# Patient Record
Sex: FEMALE | Race: OTHER | HISPANIC OR LATINO | Employment: UNEMPLOYED | URBAN - METROPOLITAN AREA
[De-identification: names, ages, dates, MRNs, and addresses within clinical notes are randomized per-mention and may not be internally consistent; named-entity substitution may affect disease eponyms.]

---

## 2022-06-07 ENCOUNTER — HOSPITAL ENCOUNTER (EMERGENCY)
Facility: HOSPITAL | Age: 47
Discharge: HOME | End: 2022-06-07
Attending: EMERGENCY MEDICINE | Admitting: EMERGENCY MEDICINE

## 2022-06-07 ENCOUNTER — APPOINTMENT (EMERGENCY)
Dept: CT IMAGING | Facility: HOSPITAL | Age: 47
End: 2022-06-07

## 2022-06-07 VITALS
TEMPERATURE: 98.3 F | RESPIRATION RATE: 18 BRPM | WEIGHT: 119.05 LBS | SYSTOLIC BLOOD PRESSURE: 134 MMHG | OXYGEN SATURATION: 100 % | HEART RATE: 72 BPM | DIASTOLIC BLOOD PRESSURE: 83 MMHG

## 2022-06-07 DIAGNOSIS — R10.9 ABDOMINAL PAIN: Primary | ICD-10-CM

## 2022-06-07 DIAGNOSIS — K80.20 CHOLELITHIASIS: ICD-10-CM

## 2022-06-07 DIAGNOSIS — R11.0 NAUSEA: ICD-10-CM

## 2022-06-07 LAB
ALBUMIN SERPL BCP-MCNC: 3.4 G/DL (ref 3.5–5)
ALP SERPL-CCNC: 92 U/L (ref 46–116)
ALT SERPL W P-5'-P-CCNC: 26 U/L (ref 12–78)
ANION GAP SERPL CALCULATED.3IONS-SCNC: 9 MMOL/L (ref 4–13)
AST SERPL W P-5'-P-CCNC: 22 U/L (ref 5–45)
BACTERIA UR QL AUTO: NORMAL /HPF
BASOPHILS # BLD AUTO: 0.05 THOUSANDS/ΜL (ref 0–0.1)
BASOPHILS NFR BLD AUTO: 1 % (ref 0–1)
BILIRUB SERPL-MCNC: 0.23 MG/DL (ref 0.2–1)
BILIRUB UR QL STRIP: NEGATIVE
BUN SERPL-MCNC: 16 MG/DL (ref 5–25)
CALCIUM ALBUM COR SERPL-MCNC: 9.6 MG/DL (ref 8.3–10.1)
CALCIUM SERPL-MCNC: 9.1 MG/DL (ref 8.3–10.1)
CHLORIDE SERPL-SCNC: 103 MMOL/L (ref 100–108)
CLARITY UR: CLEAR
CO2 SERPL-SCNC: 25 MMOL/L (ref 21–32)
COLOR UR: YELLOW
CREAT SERPL-MCNC: 0.73 MG/DL (ref 0.6–1.3)
EOSINOPHIL # BLD AUTO: 0.25 THOUSAND/ΜL (ref 0–0.61)
EOSINOPHIL NFR BLD AUTO: 3 % (ref 0–6)
ERYTHROCYTE [DISTWIDTH] IN BLOOD BY AUTOMATED COUNT: 17 % (ref 11.6–15.1)
EXT PREG TEST URINE: NEGATIVE
EXT. CONTROL ED NAV: NORMAL
GFR SERPL CREATININE-BSD FRML MDRD: 97 ML/MIN/1.73SQ M
GLUCOSE SERPL-MCNC: 206 MG/DL (ref 65–140)
GLUCOSE UR STRIP-MCNC: NEGATIVE MG/DL
HCT VFR BLD AUTO: 41.9 % (ref 34.8–46.1)
HGB BLD-MCNC: 13.2 G/DL (ref 11.5–15.4)
HGB UR QL STRIP.AUTO: ABNORMAL
IMM GRANULOCYTES # BLD AUTO: 0.02 THOUSAND/UL (ref 0–0.2)
IMM GRANULOCYTES NFR BLD AUTO: 0 % (ref 0–2)
KETONES UR STRIP-MCNC: NEGATIVE MG/DL
LEUKOCYTE ESTERASE UR QL STRIP: NEGATIVE
LIPASE SERPL-CCNC: 109 U/L (ref 73–393)
LYMPHOCYTES # BLD AUTO: 3.1 THOUSANDS/ΜL (ref 0.6–4.47)
LYMPHOCYTES NFR BLD AUTO: 42 % (ref 14–44)
MCH RBC QN AUTO: 26.6 PG (ref 26.8–34.3)
MCHC RBC AUTO-ENTMCNC: 31.5 G/DL (ref 31.4–37.4)
MCV RBC AUTO: 84 FL (ref 82–98)
MONOCYTES # BLD AUTO: 0.52 THOUSAND/ΜL (ref 0.17–1.22)
MONOCYTES NFR BLD AUTO: 7 % (ref 4–12)
NEUTROPHILS # BLD AUTO: 3.43 THOUSANDS/ΜL (ref 1.85–7.62)
NEUTS SEG NFR BLD AUTO: 47 % (ref 43–75)
NITRITE UR QL STRIP: NEGATIVE
NON-SQ EPI CELLS URNS QL MICRO: NORMAL /HPF
NRBC BLD AUTO-RTO: 0 /100 WBCS
PH UR STRIP.AUTO: 5.5 [PH] (ref 4.5–8)
PLATELET # BLD AUTO: 197 THOUSANDS/UL (ref 149–390)
PMV BLD AUTO: 11.7 FL (ref 8.9–12.7)
POTASSIUM SERPL-SCNC: 4.5 MMOL/L (ref 3.5–5.3)
PROT SERPL-MCNC: 7.6 G/DL (ref 6.4–8.2)
PROT UR STRIP-MCNC: NEGATIVE MG/DL
RBC # BLD AUTO: 4.97 MILLION/UL (ref 3.81–5.12)
RBC #/AREA URNS AUTO: NORMAL /HPF
SODIUM SERPL-SCNC: 137 MMOL/L (ref 136–145)
SP GR UR STRIP.AUTO: >=1.03 (ref 1–1.03)
UROBILINOGEN UR QL STRIP.AUTO: 0.2 E.U./DL
WBC # BLD AUTO: 7.37 THOUSAND/UL (ref 4.31–10.16)
WBC #/AREA URNS AUTO: NORMAL /HPF

## 2022-06-07 PROCEDURE — 81001 URINALYSIS AUTO W/SCOPE: CPT

## 2022-06-07 PROCEDURE — 99284 EMERGENCY DEPT VISIT MOD MDM: CPT

## 2022-06-07 PROCEDURE — 99284 EMERGENCY DEPT VISIT MOD MDM: CPT | Performed by: EMERGENCY MEDICINE

## 2022-06-07 PROCEDURE — G1004 CDSM NDSC: HCPCS

## 2022-06-07 PROCEDURE — 83690 ASSAY OF LIPASE: CPT | Performed by: EMERGENCY MEDICINE

## 2022-06-07 PROCEDURE — 96361 HYDRATE IV INFUSION ADD-ON: CPT

## 2022-06-07 PROCEDURE — 96375 TX/PRO/DX INJ NEW DRUG ADDON: CPT

## 2022-06-07 PROCEDURE — 74176 CT ABD & PELVIS W/O CONTRAST: CPT

## 2022-06-07 PROCEDURE — 36415 COLL VENOUS BLD VENIPUNCTURE: CPT | Performed by: EMERGENCY MEDICINE

## 2022-06-07 PROCEDURE — 81025 URINE PREGNANCY TEST: CPT | Performed by: EMERGENCY MEDICINE

## 2022-06-07 PROCEDURE — 80053 COMPREHEN METABOLIC PANEL: CPT | Performed by: EMERGENCY MEDICINE

## 2022-06-07 PROCEDURE — 96374 THER/PROPH/DIAG INJ IV PUSH: CPT

## 2022-06-07 PROCEDURE — 85025 COMPLETE CBC W/AUTO DIFF WBC: CPT | Performed by: EMERGENCY MEDICINE

## 2022-06-07 RX ORDER — KETOROLAC TROMETHAMINE 30 MG/ML
15 INJECTION, SOLUTION INTRAMUSCULAR; INTRAVENOUS ONCE
Status: COMPLETED | OUTPATIENT
Start: 2022-06-07 | End: 2022-06-07

## 2022-06-07 RX ORDER — METOCLOPRAMIDE 10 MG/1
10 TABLET ORAL EVERY 6 HOURS
Qty: 10 TABLET | Refills: 0 | Status: SHIPPED | OUTPATIENT
Start: 2022-06-07

## 2022-06-07 RX ORDER — ONDANSETRON 2 MG/ML
4 INJECTION INTRAMUSCULAR; INTRAVENOUS ONCE
Status: COMPLETED | OUTPATIENT
Start: 2022-06-07 | End: 2022-06-07

## 2022-06-07 RX ORDER — DICYCLOMINE HCL 20 MG
20 TABLET ORAL 2 TIMES DAILY
Qty: 10 TABLET | Refills: 0 | Status: SHIPPED | OUTPATIENT
Start: 2022-06-07

## 2022-06-07 RX ADMIN — SODIUM CHLORIDE 1000 ML: 0.9 INJECTION, SOLUTION INTRAVENOUS at 13:00

## 2022-06-07 RX ADMIN — ONDANSETRON 4 MG: 2 INJECTION INTRAMUSCULAR; INTRAVENOUS at 13:02

## 2022-06-07 RX ADMIN — KETOROLAC TROMETHAMINE 15 MG: 30 INJECTION, SOLUTION INTRAMUSCULAR; INTRAVENOUS at 13:02

## 2022-06-07 NOTE — ED PROVIDER NOTES
History  Chief Complaint   Patient presents with    Abdominal Pain     Rlq pain x2 days  Denies n/v/d     49 y o  F w/h/o DM, abdominoplasty p/w RLQ pain x 2 days  Intermittent for 3 months  Worse over the past few days  Radiates to epigastric area  "Strong" pain  Worse with eating  History provided by:  Patient   used: Yes ([email or web address] [id number])    Abdominal Pain  Pain location:  RLQ  Pain quality comment:  "strong"  Pain radiates to:  Epigastric region  Duration:  2 days  Chronicity:  New  Context: previous surgery (Cholecystectomy, abdominoplasty)    Relieved by:  None tried  Worsened by:  Nothing  Ineffective treatments:  None tried  Associated symptoms: nausea    Associated symptoms: no constipation, no diarrhea, no dysuria, no fever, no hematuria and no vomiting        None       Past Medical History:   Diagnosis Date    [facility]        History reviewed  No pertinent surgical history  History reviewed  No pertinent family history  I have reviewed and agree with the history as documented  E-Cigarette/Vaping     E-Cigarette/Vaping Substances     Social History     Tobacco Use    Smoking status: Current Every Day Smoker     Packs/day: 0 25     Types: Cigarettes    Smokeless tobacco: Never Used   Substance Use Topics    Alcohol use: Not Currently    Drug use: Never       Review of Systems   Constitutional: Negative for fever  Gastrointestinal: Positive for abdominal pain and nausea  Negative for constipation, diarrhea and vomiting  Genitourinary: Negative for dysuria and hematuria  All other systems reviewed and are negative  Physical Exam  Physical Exam  Vitals and nursing note reviewed  Constitutional:       General: She is not in acute distress  Appearance: Normal appearance  She is well-developed  She is not ill-appearing, toxic-appearing or diaphoretic  HENT:      Head: Normocephalic and atraumatic     Eyes:      General: No scleral icterus  Neck:      Vascular: No JVD  Trachea: Trachea normal    Cardiovascular:      Rate and Rhythm: Normal rate and regular rhythm  Heart sounds: Normal heart sounds  No murmur heard  No friction rub  Pulmonary:      Effort: Pulmonary effort is normal  No accessory muscle usage or respiratory distress  Breath sounds: Normal breath sounds  No stridor  No wheezing, rhonchi or rales  Abdominal:      General: There is no distension  Palpations: Abdomen is soft  Abdomen is not rigid  There is no mass  Tenderness: There is abdominal tenderness in the right lower quadrant  There is no guarding or rebound  Musculoskeletal:      Cervical back: Normal range of motion  Skin:     General: Skin is warm and dry  Coloration: Skin is not pale  Findings: No rash  Neurological:      Mental Status: She is alert  GCS: GCS eye subscore is 4  GCS verbal subscore is 5  GCS motor subscore is 6     Psychiatric:         Behavior: Behavior normal          Vital Signs  ED Triage Vitals   Temperature Pulse Respirations Blood Pressure SpO2   06/07/22 1233 06/07/22 1233 06/07/22 1233 06/07/22 1233 06/07/22 1233   98 3 °F (36 8 °C) 72 18 134/83 100 %      Temp Source Heart Rate Source Patient Position - Orthostatic VS BP Location FiO2 (%)   06/07/22 1233 06/07/22 1233 06/07/22 1233 06/07/22 1233 --   Oral Monitor Sitting Right arm       Pain Score       06/07/22 1302       9           Vitals:    06/07/22 1233   BP: 134/83   Pulse: 72   Patient Position - Orthostatic VS: Sitting         Visual Acuity      ED Medications  Medications   ketorolac (TORADOL) injection 15 mg (15 mg Intravenous Given 6/7/22 1302)   ondansetron (ZOFRAN) injection 4 mg (4 mg Intravenous Given 6/7/22 1302)   sodium chloride 0 9 % bolus 1,000 mL (0 mL Intravenous Stopped 6/7/22 1428)       Diagnostic Studies  Results Reviewed     Procedure Component Value Units Date/Time    Comprehensive metabolic panel [[id number]] (Abnormal) Collected: 06/07/22 1258    Lab Status: Final result Specimen: Blood from Hand, Right Updated: 06/07/22 1344     Sodium 137 mmol/L      Potassium 4 5 mmol/L      Chloride 103 mmol/L      CO2 25 mmol/L      ANION GAP 9 mmol/L      BUN 16 mg/dL      Creatinine 0 73 mg/dL      Glucose 206 mg/dL      Calcium 9 1 mg/dL      Corrected Calcium 9 6 mg/dL      AST 22 U/L      ALT 26 U/L      Alkaline Phosphatase 92 U/L      Total Protein 7 6 g/dL      Albumin 3 4 g/dL      Total Bilirubin 0 23 mg/dL      eGFR 97 ml/min/1 73sq m     Narrative:      National Kidney Disease Foundation guidelines for Chronic Kidney Disease (CKD):     Stage 1 with normal or high GFR (GFR > 90 mL/min/1 73 square meters)    Stage 2 Mild CKD (GFR = 60-89 mL/min/1 73 square meters)    Stage 3A Moderate CKD (GFR = 45-59 mL/min/1 73 square meters)    Stage 3B Moderate CKD (GFR = 30-44 mL/min/1 73 square meters)    Stage 4 Severe CKD (GFR = 15-29 mL/min/1 73 square meters)    Stage 5 End Stage CKD (GFR <15 mL/min/1 73 square meters)  Note: GFR calculation is accurate only with a steady state creatinine    Lipase [[id number]]  (Normal) Collected: 06/07/22 1258    Lab Status: Final result Specimen: Blood from Hand, Right Updated: 06/07/22 1344     Lipase 109 u/L     Urine Microscopic [[id number]]  (Normal) Collected: 06/07/22 1251    Lab Status: Final result Specimen: Urine Updated: 06/07/22 1334     RBC, UA None Seen /hpf      WBC, UA None Seen /hpf      Epithelial Cells Occasional /hpf      Bacteria, UA None Seen /hpf     CBC and differential [[id number]]  (Abnormal) Collected: 06/07/22 1258    Lab Status: Final result Specimen: Blood from Arm, Right Updated: 06/07/22 1312     WBC 7 37 Thousand/uL      RBC 4 97 Million/uL      Hemoglobin 13 2 g/dL      Hematocrit 41 9 %      MCV 84 fL      MCH 26 6 pg      MCHC 31 5 g/dL      RDW 17 0 %      MPV 11 7 fL      Platelets 614 Thousands/uL      nRBC 0 /100 WBCs      Neutrophils Relative 47 % Immat GRANS % 0 %      Lymphocytes Relative 42 %      Monocytes Relative 7 %      Eosinophils Relative 3 %      Basophils Relative 1 %      Neutrophils Absolute 3 43 Thousands/µL      Immature Grans Absolute 0 02 Thousand/uL      Lymphocytes Absolute 3 10 Thousands/µL      Monocytes Absolute 0 52 Thousand/µL      Eosinophils Absolute 0 25 Thousand/µL      Basophils Absolute 0 05 Thousands/µL     POCT pregnancy, urine [[id number]]  (Normal) Resulted: 06/07/22 1306    Lab Status: Final result Updated: 06/07/22 1306     EXT PREG TEST UR (Ref: Negative) negative     Control valid    Urine Macroscopic, POC [[id number]]  (Abnormal) Collected: 06/07/22 1251    Lab Status: Final result Specimen: Urine Updated: 06/07/22 1253     Color, UA Yellow     Clarity, UA Clear     pH, UA 5 5     Leukocytes, UA Negative     Nitrite, UA Negative     Protein, UA Negative mg/dl      Glucose, UA Negative mg/dl      Ketones, UA Negative mg/dl      Urobilinogen, UA 0 2 E U /dl      Bilirubin, UA Negative     Blood, UA Trace     Specific Gravity, UA >=1 030    Narrative:      CLINITEK RESULT                 CT abdomen pelvis wo contrast   Final Result by Samantha Knight MD (06/07 1423)      No source for acute abdominal pain identified  A normal appendix is visualized  Colonic diverticulosis without diverticulitis  Cholelithiasis without cholecystitis  Workstation performed: [id number]                    Procedures  Procedures         ED Course  ED Course as of 06/07/22 1514   Tue Jun 07, 2022   1302 Pt given Toradol/Zofran  Serafin Lemons [id number], discussed results and instructed pt to f/u with GI since pain has been ongoing for 3 months                                               MDM    Disposition  Final diagnoses:   Abdominal pain   Nausea   Cholelithiasis     Time reflects when diagnosis was documented in both MDM as applicable and the Disposition within this note     Time User Action Codes Description Comment 6/7/2022  2:45 PM Melvina Moore Add [R10 9] Abdominal pain     6/7/2022  2:45 PM Yolanda Moore 48 [R11 0] Nausea     6/7/2022  2:56 PM JANINE Moore Home	[place] Add [K80 20] Cholelithiasis       ED Disposition     ED Disposition   Discharge    Condition   Stable    Date/Time   Tue Jun 7, 2022  2:57 PM    Comment   Genevive Hodgkins discharge to home/self care                 Follow-up Information     Follow up With Specialties Details Why Contact Info Additional [facility] Schedule an appointment as soon as possible for a visit   [address] [facility]  [address] [facility] [address]  Negro Rodriguez [address] [facility], [address], [address], [place], [place], [address] [phone]          Patient's Medications   Discharge Prescriptions    DICYCLOMINE (BENTYL) 20 MG TABLET    Take 1 tablet (20 mg total) by mouth 2 (two) times a day       Start Date: 6/7/2022  End Date: --       Order Dose: 20 mg       Quantity: 10 tablet    Refills: 0    METOCLOPRAMIDE (REGLAN) 10 MG TABLET    Take 1 tablet (10 mg total) by mouth every 6 (six) hours       Start Date: 6/7/2022  End Date: --       Order Dose: 10 mg       Quantity: 10 tablet    Refills: 0           PDMP Review     None          ED Provider  Electronically Signed by           Donnetta Schaumann, DO  06/07/22 6425

## 2022-07-05 ENCOUNTER — HOSPITAL ENCOUNTER (EMERGENCY)
Facility: HOSPITAL | Age: 47
Discharge: HOME | End: 2022-07-05
Attending: EMERGENCY MEDICINE

## 2022-07-05 ENCOUNTER — APPOINTMENT (EMERGENCY)
Dept: RADIOLOGY | Facility: HOSPITAL | Age: 47
End: 2022-07-05

## 2022-07-05 VITALS
WEIGHT: 130.51 LBS | HEART RATE: 85 BPM | SYSTOLIC BLOOD PRESSURE: 126 MMHG | RESPIRATION RATE: 16 BRPM | OXYGEN SATURATION: 95 % | TEMPERATURE: 98.5 F | DIASTOLIC BLOOD PRESSURE: 83 MMHG

## 2022-07-05 DIAGNOSIS — B35.4 TINEA CORPORIS: ICD-10-CM

## 2022-07-05 DIAGNOSIS — N39.0 UTI (URINARY TRACT INFECTION): ICD-10-CM

## 2022-07-05 DIAGNOSIS — R10.9 ABDOMINAL PAIN: Primary | ICD-10-CM

## 2022-07-05 LAB
ALBUMIN SERPL BCP-MCNC: 3.1 G/DL (ref 3.5–5)
ALP SERPL-CCNC: 100 U/L (ref 46–116)
ALT SERPL W P-5'-P-CCNC: 21 U/L (ref 12–78)
ANION GAP SERPL CALCULATED.3IONS-SCNC: 8 MMOL/L (ref 4–13)
AST SERPL W P-5'-P-CCNC: 15 U/L (ref 5–45)
BACTERIA UR QL AUTO: ABNORMAL /HPF
BASOPHILS # BLD AUTO: 0.03 THOUSANDS/ΜL (ref 0–0.1)
BASOPHILS NFR BLD AUTO: 0 % (ref 0–1)
BILIRUB SERPL-MCNC: 0.2 MG/DL (ref 0.2–1)
BILIRUB UR QL STRIP: NEGATIVE
BUN SERPL-MCNC: 14 MG/DL (ref 5–25)
CALCIUM ALBUM COR SERPL-MCNC: 9.3 MG/DL (ref 8.3–10.1)
CALCIUM SERPL-MCNC: 8.6 MG/DL (ref 8.3–10.1)
CHLORIDE SERPL-SCNC: 107 MMOL/L (ref 100–108)
CLARITY UR: CLEAR
CO2 SERPL-SCNC: 24 MMOL/L (ref 21–32)
COLOR UR: YELLOW
CREAT SERPL-MCNC: 0.61 MG/DL (ref 0.6–1.3)
EOSINOPHIL # BLD AUTO: 0.22 THOUSAND/ΜL (ref 0–0.61)
EOSINOPHIL NFR BLD AUTO: 2 % (ref 0–6)
ERYTHROCYTE [DISTWIDTH] IN BLOOD BY AUTOMATED COUNT: 17.2 % (ref 11.6–15.1)
EXT PREG TEST URINE: NEGATIVE
EXT. CONTROL ED NAV: NORMAL
GFR SERPL CREATININE-BSD FRML MDRD: 107 ML/MIN/1.73SQ M
GLUCOSE SERPL-MCNC: 123 MG/DL (ref 65–140)
GLUCOSE UR STRIP-MCNC: ABNORMAL MG/DL
HCT VFR BLD AUTO: 40.7 % (ref 34.8–46.1)
HGB BLD-MCNC: 13 G/DL (ref 11.5–15.4)
HGB UR QL STRIP.AUTO: NEGATIVE
IMM GRANULOCYTES # BLD AUTO: 0.04 THOUSAND/UL (ref 0–0.2)
IMM GRANULOCYTES NFR BLD AUTO: 0 % (ref 0–2)
KETONES UR STRIP-MCNC: NEGATIVE MG/DL
LEUKOCYTE ESTERASE UR QL STRIP: ABNORMAL
LYMPHOCYTES # BLD AUTO: 2.17 THOUSANDS/ΜL (ref 0.6–4.47)
LYMPHOCYTES NFR BLD AUTO: 24 % (ref 14–44)
MCH RBC QN AUTO: 26.9 PG (ref 26.8–34.3)
MCHC RBC AUTO-ENTMCNC: 31.9 G/DL (ref 31.4–37.4)
MCV RBC AUTO: 84 FL (ref 82–98)
MONOCYTES # BLD AUTO: 0.68 THOUSAND/ΜL (ref 0.17–1.22)
MONOCYTES NFR BLD AUTO: 8 % (ref 4–12)
MUCOUS THREADS UR QL AUTO: ABNORMAL
NEUTROPHILS # BLD AUTO: 5.87 THOUSANDS/ΜL (ref 1.85–7.62)
NEUTS SEG NFR BLD AUTO: 66 % (ref 43–75)
NITRITE UR QL STRIP: NEGATIVE
NON-SQ EPI CELLS URNS QL MICRO: ABNORMAL /HPF
NRBC BLD AUTO-RTO: 0 /100 WBCS
PH UR STRIP.AUTO: 6 [PH]
PLATELET # BLD AUTO: 182 THOUSANDS/UL (ref 149–390)
PMV BLD AUTO: 11.2 FL (ref 8.9–12.7)
POTASSIUM SERPL-SCNC: 4.1 MMOL/L (ref 3.5–5.3)
PROT SERPL-MCNC: 7.8 G/DL (ref 6.4–8.2)
PROT UR STRIP-MCNC: NEGATIVE MG/DL
RBC # BLD AUTO: 4.84 MILLION/UL (ref 3.81–5.12)
RBC #/AREA URNS AUTO: ABNORMAL /HPF
SODIUM SERPL-SCNC: 139 MMOL/L (ref 136–145)
SP GR UR STRIP.AUTO: >=1.03 (ref 1–1.03)
UROBILINOGEN UR QL STRIP.AUTO: 0.2 E.U./DL
WBC # BLD AUTO: 9.01 THOUSAND/UL (ref 4.31–10.16)
WBC #/AREA URNS AUTO: ABNORMAL /HPF

## 2022-07-05 PROCEDURE — 36415 COLL VENOUS BLD VENIPUNCTURE: CPT | Performed by: EMERGENCY MEDICINE

## 2022-07-05 PROCEDURE — 96374 THER/PROPH/DIAG INJ IV PUSH: CPT

## 2022-07-05 PROCEDURE — 80053 COMPREHEN METABOLIC PANEL: CPT | Performed by: EMERGENCY MEDICINE

## 2022-07-05 PROCEDURE — 85025 COMPLETE CBC W/AUTO DIFF WBC: CPT | Performed by: EMERGENCY MEDICINE

## 2022-07-05 PROCEDURE — 81001 URINALYSIS AUTO W/SCOPE: CPT | Performed by: EMERGENCY MEDICINE

## 2022-07-05 PROCEDURE — 99284 EMERGENCY DEPT VISIT MOD MDM: CPT | Performed by: EMERGENCY MEDICINE

## 2022-07-05 PROCEDURE — 74022 RADEX COMPL AQT ABD SERIES: CPT

## 2022-07-05 PROCEDURE — 81025 URINE PREGNANCY TEST: CPT | Performed by: EMERGENCY MEDICINE

## 2022-07-05 PROCEDURE — 99284 EMERGENCY DEPT VISIT MOD MDM: CPT

## 2022-07-05 RX ORDER — CLOTRIMAZOLE 1 %
CREAM (GRAM) TOPICAL ONCE
Status: COMPLETED | OUTPATIENT
Start: 2022-07-05 | End: 2022-07-05

## 2022-07-05 RX ORDER — CEPHALEXIN 500 MG/1
500 CAPSULE ORAL EVERY 6 HOURS SCHEDULED
Qty: 20 CAPSULE | Refills: 0 | Status: SHIPPED | OUTPATIENT
Start: 2022-07-05 | End: 2022-07-10

## 2022-07-05 RX ORDER — CLOTRIMAZOLE 1 %
CREAM (GRAM) TOPICAL
Qty: 15 G | Refills: 0 | Status: SHIPPED | OUTPATIENT
Start: 2022-07-05

## 2022-07-05 RX ORDER — CLOTRIMAZOLE 1 %
CREAM (GRAM) TOPICAL
Qty: 15 G | Refills: 0 | Status: CANCELLED | OUTPATIENT
Start: 2022-07-05

## 2022-07-05 RX ORDER — CEPHALEXIN 250 MG/1
500 CAPSULE ORAL ONCE
Status: COMPLETED | OUTPATIENT
Start: 2022-07-05 | End: 2022-07-05

## 2022-07-05 RX ORDER — KETOROLAC TROMETHAMINE 30 MG/ML
30 INJECTION, SOLUTION INTRAMUSCULAR; INTRAVENOUS ONCE
Status: COMPLETED | OUTPATIENT
Start: 2022-07-05 | End: 2022-07-05

## 2022-07-05 RX ADMIN — KETOROLAC TROMETHAMINE 30 MG: 30 INJECTION, SOLUTION INTRAMUSCULAR; INTRAVENOUS at 17:42

## 2022-07-05 RX ADMIN — CEPHALEXIN 500 MG: 250 CAPSULE ORAL at 18:41

## 2022-07-05 RX ADMIN — CLOTRIMAZOLE: 1 CREAM TOPICAL at 17:43

## 2022-07-05 NOTE — ED PROVIDER NOTES
History  Chief Complaint   Patient presents with    Abdominal Pain     Pt reports abdominal pain and generalized rash for the past 10 days; Patient is a 50-year-old female Armenian-speaking only coming in today complaining of rash is been ongoing for several days as well as abdominal pain  Use of  services at bedside  Patient states that the rash started several days ago and has a family member with similar symptoms  She describes as itchy and throughout the bilateral hands  She has no new detergents, soaps, clothing, antibiotics, food, medication  She did not take anything for this  Patient also complains of abdominal pain that is been ongoing for several weeks  She reports that she was originally seen in [facility] and told she had gallstones  She came here had testing done that also stated she had gallstones  She has no fevers, chills, difficulty with eating or drinking  She has no vomiting or diarrhea  No hematemesis or hemoptysis  No bright blood per rectum or melena  Patient does report of she did not take anything for this    When asked her where her pain was she points to the suprapubic region and describes as a pain      History provided by:  Patient   used: Yes    Rash  Location:  Shoulder/arm  Shoulder/arm rash location:  L upper arm, R upper arm, L forearm and R forearm  Quality: itchiness    Severity:  Mild  Onset quality:  Gradual  Timing:  Constant  Progression:  Spreading  Chronicity:  New  Context: exposure to similar rash    Context: not animal contact, not chemical exposure, not diapers, not eggs, not food, not hot tub use, not insect bite/sting, not medications, not new detergent/soap, not nuts, not plant contact, not pollen, not pregnancy, not sick contacts and not sun exposure    Relieved by:  None tried  Worsened by:  Nothing  Ineffective treatments:  None tried  Associated symptoms: abdominal pain    Associated symptoms: no diarrhea, no fatigue, no fever, no headaches, no hoarse voice, no induration, no joint pain, no myalgias, no nausea, no periorbital edema, no shortness of breath, no sore throat, no throat swelling, no tongue swelling, no URI, not vomiting and not wheezing    Abdominal pain:     Location:  Suprapubic    Quality: aching and cramping      Severity:  Mild    Onset quality:  Gradual    Timing:  Intermittent    Progression:  Waxing and waning    Chronicity:  Recurrent      Prior to Admission Medications   Prescriptions Last Dose Informant Patient Reported? Taking?   dicyclomine (BENTYL) 20 mg tablet Not Taking at Unknown time  No No   Sig: Take 1 tablet (20 mg total) by mouth 2 (two) times a day   Patient not taking: Reported on 7/5/2022   metoclopramide (REGLAN) 10 mg tablet Not Taking at Unknown time  No No   Sig: Take 1 tablet (10 mg total) by mouth every 6 (six) hours   Patient not taking: Reported on 7/5/2022      Facility-Administered Medications: None       Past Medical History:   Diagnosis Date    [facility]        History reviewed  No pertinent surgical history  History reviewed  No pertinent family history  I have reviewed and agree with the history as documented  E-Cigarette/Vaping     E-Cigarette/Vaping Substances     Social History     Tobacco Use    Smoking status: Current Every Day Smoker     Packs/day: 0 25     Types: Cigarettes    Smokeless tobacco: Never Used   Substance Use Topics    Alcohol use: Not Currently    Drug use: Never       Review of Systems   Constitutional: Negative  Negative for chills, fatigue and fever  HENT: Negative  Negative for ear pain, hoarse voice and sore throat  Eyes: Negative  Negative for pain and visual disturbance  Respiratory: Negative  Negative for cough, shortness of breath and wheezing  Cardiovascular: Negative  Negative for chest pain and palpitations  Gastrointestinal: Positive for abdominal pain   Negative for diarrhea, nausea and vomiting  Genitourinary: Negative  Negative for dysuria and hematuria  Musculoskeletal: Negative  Negative for arthralgias, back pain and myalgias  Skin: Positive for rash  Negative for color change  Neurological: Negative  Negative for seizures, syncope and headaches  Hematological: Negative  Psychiatric/Behavioral: Negative  All other systems reviewed and are negative  Physical Exam  Physical Exam  Vitals and nursing note reviewed  Constitutional:       General: She is not in acute distress  Appearance: She is well-developed  HENT:      Head: Normocephalic and atraumatic  Comments: Patient maintaining airway and secretions  No stridor   No brawniness under tongue  Eyes:      Extraocular Movements: Extraocular movements intact  Conjunctiva/sclera: Conjunctivae normal       Pupils: Pupils are equal, round, and reactive to light  Cardiovascular:      Rate and Rhythm: Normal rate and regular rhythm  Heart sounds: No murmur heard  Pulmonary:      Effort: Pulmonary effort is normal  No respiratory distress  Breath sounds: Normal breath sounds  Abdominal:      Palpations: Abdomen is soft  Tenderness: There is abdominal tenderness in the right lower quadrant, suprapubic area and left lower quadrant  There is no right CVA tenderness, left CVA tenderness, guarding or rebound  Negative signs include Rovsing's sign  Musculoskeletal:      Cervical back: Neck supple  Skin:     General: Skin is warm and dry  Comments: There is no lesions on palms, soles or intraoral   Patient does have elevated, erythematous annular lesions throughout the bilateral upper extremities  There is no vesicular   Neurological:      General: No focal deficit present  Mental Status: She is alert and oriented to person, place, and time  GCS: GCS eye subscore is 4  GCS verbal subscore is 5  GCS motor subscore is 6  Cranial Nerves: Cranial nerves are intact  Sensory: Sensation is intact  Motor: Motor function is intact  Coordination: Coordination is intact  Gait: Gait is intact     Psychiatric:         Attention and Perception: Attention and perception normal          Vital Signs  ED Triage Vitals   Temperature Pulse Respirations Blood Pressure SpO2   07/05/22 1432 07/05/22 1432 07/05/22 1432 07/05/22 1432 07/05/22 1432   98 5 °F (36 9 °C) 81 20 124/83 100 %      Temp src Heart Rate Source Patient Position - Orthostatic VS BP Location FiO2 (%)   -- 07/05/22 1842 07/05/22 1702 07/05/22 1702 --    Monitor Lying Right arm       Pain Score       07/05/22 1742       9           Vitals:    07/05/22 1432 07/05/22 1702 07/05/22 1842   BP: 124/83 114/76 126/83   Pulse: 81 71 85   Patient Position - Orthostatic VS:  Lying Lying         Visual Acuity      ED Medications  Medications   ketorolac (TORADOL) injection 30 mg (30 mg Intravenous Given 7/5/22 1742)   clotrimazole (LOTRIMIN) 1 % cream ( Topical Given 7/5/22 1743)   cephalexin (KEFLEX) capsule 500 mg (500 mg Oral Given 7/5/22 1841)       Diagnostic Studies  Results Reviewed     Procedure Component Value Units Date/Time    Urine Microscopic [[id number]]  (Abnormal) Collected: 07/05/22 1702    Lab Status: Final result Specimen: Urine, Clean Catch Updated: 07/05/22 1815     RBC, UA 0-1 /hpf      WBC, UA 0-1 /hpf      Epithelial Cells Moderate /hpf      Bacteria, UA Innumerable /hpf      MUCUS THREADS Occasional    Comprehensive metabolic panel [[id number]]  (Abnormal) Collected: 07/05/22 1741    Lab Status: Final result Specimen: Blood from Hand, Right Updated: 07/05/22 1814     Sodium 139 mmol/L      Potassium 4 1 mmol/L      Chloride 107 mmol/L      CO2 24 mmol/L      ANION GAP 8 mmol/L      BUN 14 mg/dL      Creatinine 0 61 mg/dL      Glucose 123 mg/dL      Calcium 8 6 mg/dL      Corrected Calcium 9 3 mg/dL      AST 15 U/L      ALT 21 U/L      Alkaline Phosphatase 100 U/L      Total Protein 7 8 g/dL      Albumin 3 1 g/dL      Total Bilirubin 0 20 mg/dL      eGFR 107 ml/min/1 73sq m     Narrative:      [place] guidelines for Chronic Kidney Disease (CKD):     Stage 1 with normal or high GFR (GFR > 90 mL/min/1 73 square meters)    Stage 2 Mild CKD (GFR = 60-89 mL/min/1 73 square meters)    Stage 3A Moderate CKD (GFR = 45-59 mL/min/1 73 square meters)    Stage 3B Moderate CKD (GFR = 30-44 mL/min/1 73 square meters)    Stage 4 Severe CKD (GFR = 15-29 mL/min/1 73 square meters)    Stage 5 End Stage CKD (GFR <15 mL/min/1 73 square meters)  Note: GFR calculation is accurate only with a steady state creatinine    CBC and differential [[id number]]  (Abnormal) Collected: 07/05/22 1741    Lab Status: Final result Specimen: Blood from Hand, Right Updated: 07/05/22 1756     WBC 9 01 Thousand/uL      RBC 4 84 Million/uL      Hemoglobin 13 0 g/dL      Hematocrit 40 7 %      MCV 84 fL      MCH 26 9 pg      MCHC 31 9 g/dL      RDW 17 2 %      MPV 11 2 fL      Platelets 869 Thousands/uL      nRBC 0 /100 WBCs      Neutrophils Relative 66 %      Immat GRANS % 0 %      Lymphocytes Relative 24 %      Monocytes Relative 8 %      Eosinophils Relative 2 %      Basophils Relative 0 %      Neutrophils Absolute 5 87 Thousands/µL      Immature Grans Absolute 0 04 Thousand/uL      Lymphocytes Absolute 2 17 Thousands/µL      Monocytes Absolute 0 68 Thousand/µL      Eosinophils Absolute 0 22 Thousand/µL      Basophils Absolute 0 03 Thousands/µL     UA (URINE) with reflex to Scope [[id number]]  (Abnormal) Collected: 07/05/22 1702    Lab Status: Final result Specimen: Urine, Clean Catch Updated: 07/05/22 1736     Color, UA Yellow     Clarity, UA Clear     Specific Gravity, UA >=1 030     pH, UA 6 0     Leukocytes, UA Elevated glucose may cause decreased leukocyte values   See urine microscopic for [facility] result/     Nitrite, UA Negative     Protein, UA Negative mg/dl      Glucose, UA >=1000 (1%) mg/dl      Ketones, UA Negative mg/dl      Urobilinogen, UA 0 2 E U /dl      Bilirubin, UA Negative     Occult Blood, UA Negative    POCT pregnancy, urine [[id number]]  (Normal) Resulted: 07/05/22 1702    Lab Status: Final result Updated: 07/05/22 1702     EXT PREG TEST UR (Ref: Negative) NEGATIVE     Control valid                 XR abdomen obstruction series    (Results Pending)              Procedures  Procedures         ED Course  ED Course as of 07/05/22 1922 Tue Jul 05, 2022   1649 Patient is a 56-year-old female Latvian-speaking only coming in today with complaints of persistent abdominal pain as well as rash  On exam well appearing, no acute distress non peritoneal   Patient's rash is consistent with fungal infection discussed with her will give her medication for this  In regards patient's abdominal pain, patient was seen recently and was diagnosed with gallstones  However no evidence of acute cholecystitis  Patient points to suprapubic pain has no biliary colic symptoms at this time  Will refer the review chart, obtain labs, urine, x-ray as well as give medications for symptomatic control    Portions of the record may have been created with voice recognition software  Occasional wrong word or "sound a like" substitutions may have occurred due to the inherent limitations of voice recognition software  Read the chart carefully and recognize, using context, where substitutions have occurred  1827 Patient's labs are stable  Patient does have glucose in urine without any ketone  No evidence of anion gap acidosis  There is noted bacteria no epithelials  Compared to 4 weeks ago this was normal   However patient is symptomatic  Will treat empirically as well as DC home with antibiotics                               SBIRT 22yo+    Flowsheet Row Most Recent Value   SBIRT (25 yo +)    In order to provide better care to our patients, we are screening all of our patients for alcohol and drug use   Would it be okay to ask you these screening questions? Unable to answer at this time Filed at: 07/05/2022 1635                    MDM  Number of Diagnoses or Management Options  Diagnosis management comments:     Differential diagnosis includes but not limited to:  Appendicitis, viral syndrome, constipation, AMI, NSTEMI, pneumonia, pneuothorax, gerd, gastritis,  mesenteric ischemia, mesenteric adenitis, pancreatitis, cholecystitis, choledocholithiasis, hepatitis, bowel obstruction, ileus, gastroenteritis, colitis, malignancy, AAA, perforation, toxicologic poisoning, renal infarct, acute kidney injury, splenic infarct, splenic injury, nephrolithiasis, UTI, muscular strain, intra-abdominal hematoma, hernia    Differential diagnosis includes but not limited to: Allergic reaction, meningitis, syphilis, Arlice Backer syndrome, TENS, , contact dermatitis, tinea, Lyme, vesicular rash, lipoma, basal cell carcinoma, squamous cell carcinoma, melanoma, warts, drug rash, pemphigoid, acne, eczema, folliculitis/cellulitis/infectious           Amount and/or Complexity of Data Reviewed  Clinical lab tests: ordered and reviewed  Tests in the radiology section of CPT®: ordered and reviewed  Tests in the medicine section of CPT®: ordered and reviewed  Review and summarize past medical records: yes        Disposition  Final diagnoses:   Abdominal pain   Tinea corporis   UTI (urinary tract infection)     Time reflects when diagnosis was documented in both MDM as applicable and the Disposition within this note     Time User Action Codes Description Comment    7/5/2022  5:07 PM Jennifer Brooks Add [R10 9] Abdominal pain     7/5/2022  5:07 PM Jennifer Brooks Add [B35 4] Tinea corporis     7/5/2022  6:35 PM Pili Brooks Add [N39 0] UTI (urinary tract infection)       ED Disposition     ED Disposition   Discharge    Condition   Stable    Date/Time   Tue Jul 5, 2022  6:35 PM    Comment   Johnna Moon discharge to home/self care                 Follow-up Information     Follow up With Specialties Details Why Contact Info Additional [address] [facility] Schedule an appointment as soon as possible for a visit in 3 days  [address], [address] [facility] [address]  [address] [address], [address], [address] [place], [place], [address]          Discharge Medication List as of 7/5/2022  6:37 PM      START taking these medications    Details   cephalexin (KEFLEX) 500 mg capsule Take 1 capsule (500 mg total) by mouth every 6 (six) hours for 5 days, Starting Tue 7/5/2022, Until Sun 7/10/2022, Print      clotrimazole (LOTRIMIN) 1 % cream Apply to affected area 2 times daily, Print         CONTINUE these medications which have NOT CHANGED    Details   dicyclomine (BENTYL) 20 mg tablet Take 1 tablet (20 mg total) by mouth 2 (two) times a day, Starting Tue 6/7/2022, Print      metoclopramide (REGLAN) 10 mg tablet Take 1 tablet (10 mg total) by mouth every 6 (six) hours, Starting Tue 6/7/2022, Print                 PDMP Review     None          ED Provider  Electronically Signed by           Lito Abebe DO  07/05/22 6511

## 2022-07-09 ENCOUNTER — HOSPITAL ENCOUNTER (EMERGENCY)
Facility: HOSPITAL | Age: 47
Discharge: HOME | End: 2022-07-09
Attending: EMERGENCY MEDICINE

## 2022-07-09 VITALS
DIASTOLIC BLOOD PRESSURE: 82 MMHG | WEIGHT: 132.72 LBS | OXYGEN SATURATION: 100 % | SYSTOLIC BLOOD PRESSURE: 135 MMHG | HEART RATE: 68 BPM | TEMPERATURE: 98.5 F | RESPIRATION RATE: 20 BRPM

## 2022-07-09 DIAGNOSIS — R21 RASH AND NONSPECIFIC SKIN ERUPTION: Primary | ICD-10-CM

## 2022-07-09 PROCEDURE — 99282 EMERGENCY DEPT VISIT SF MDM: CPT

## 2022-07-09 PROCEDURE — 99282 EMERGENCY DEPT VISIT SF MDM: CPT | Performed by: PHYSICIAN ASSISTANT

## 2022-07-09 RX ORDER — DIPHENHYDRAMINE HCL 25 MG
25 TABLET ORAL ONCE
Status: COMPLETED | OUTPATIENT
Start: 2022-07-09 | End: 2022-07-09

## 2022-07-09 RX ORDER — DIPHENHYDRAMINE HCL 25 MG
25 TABLET ORAL EVERY 6 HOURS
Qty: 20 TABLET | Refills: 0 | Status: SHIPPED | OUTPATIENT
Start: 2022-07-09

## 2022-07-09 RX ORDER — DIPHENHYDRAMINE HYDROCHLORIDE 50 MG/ML
25 INJECTION INTRAMUSCULAR; INTRAVENOUS ONCE
Status: DISCONTINUED | OUTPATIENT
Start: 2022-07-09 | End: 2022-07-09

## 2022-07-09 RX ADMIN — DIPHENHYDRAMINE HCL 25 MG: 25 TABLET, COATED ORAL at 02:50

## 2022-07-09 NOTE — DISCHARGE INSTRUCTIONS
You were seen in the emergency department today for rash  Please follow-up with your primary care physician and Dermatology regarding your symptoms  Please return to the emergency department with any worsening symptoms including but not limited to: fevers, dizziness, chest pain, shortness of breath, palpitations, loss of consciousness, abdominal pain, nausea, vomiting, diarrhea, or lower extremity changes  I suspect this may be a viral rash - pityriasis rosacea-  at this point recommend follow-up with Dermatology closely  Benadryl as needed for itching  Attempt exposing yourself to the sun brief amounts  Follow-up primary care  Sexually Transmitted Disease/HIV Prevention   COVID-19 INFORMATION  Sex & Coronavirus    Sexo y [facility]BRIL [facility] INFORMATION  Telephone  [phone] Ext  [phone]  Office Hours:  Monday - Friday, 8:00 AM - 4:30 PM  Sexually Transmitted Disease (STD) Clinic:  DUE TO COVID PRECAUTIONS, APPOINTMENTS ARE NOW REQUIRED  Date: Tuesday, 1:00 PM - 4:00 PM* (includes full STD exam and HIV blood test)  Cost: Free for both [address] [place] [address] residents and non-City residents  [facility]: [phone]   *Note: The number of patients accepted into clinic for STD testing is subject to provider availability  HIV Testing Clinic:  DUE TO COVID PRECAUTIONS, APPOINTMENTS ARE NOW REQUIRED  Dates: Tuesday, 1:00 PM - 4:00 PM (HIV/Syphilis)  Thursday 2:00 PM - 4:00 PM (HIV/Syphilis)  Cost: Free for both [address] [place] [address] and non-City residents  [facility]: [phone]   Target Population:   Individuals 15years of age or older who are experiencing symptoms or feel they may have been exposed to a sexually transmitted disease  Geographic area served:   [facility] residents  Program Description:  The purpose of the STD program is to prevent and control the spread of STDs   Confidential treatment, epidemiological and educational services are provided for patients with STDs  The STD clinic is staffed by physicians/nurse practitioners who examine, test, diagnose, and treat all STDs including: HIV, chlamydia, gonorrhea, syphilis, herpes, venereal warts, etc  Hepatitis B vaccination and Hepatitis C screening for eligible patients    Primary Services:  Medical exam and treatment   Laboratory testing   Epidemiological services - contact tracing, partner notification   Counseling and testing   Partner notification services   T cell testing when appropriate   Speakers [place] and video Evelin Cody 19 available   Information and referral services

## 2022-07-09 NOTE — ED PROVIDER NOTES
History  Chief Complaint   Patient presents with    Rash     Pt c/o worsening rash  Seen at other ER 3 days ago for same and UTI  Given antibiotics  Asya Zelaya is a 49 y o   female with PMH of DM who presents to the emergency department with generalized rash  Patient states that she was recently seen here 3 days prior and had a rash to left forearm scattered across her body  States she was told was tinea corporis  States rash seems to be more pruritic and may have spread slightly  She denies any skin sloughing, fevers, chills, abdominal pain, nausea, vomiting, diarrhea, difficulty breathing, chest pain, shortness a breath, throat sensation, enlarging tongue, soft palate swelling, dizziness, lightheadedness, or headaches  Denies any new exposures to foods, medications ( finished Keflex), animals, outdoor exposures, soaps or detergents or perfumes  She denies any tick bites  No rash on palms or soles              History provided by:  Patient   used: No    Rash  Location:  Full body  Quality: itchiness and redness    Severity:  Moderate  Onset quality:  Gradual  Duration:  5 days  Timing:  Constant  Progression:  Waxing and waning  Chronicity:  New  Context: not animal contact, not chemical exposure, not diapers, not eggs, not exposure to similar rash, not food, not hot tub use, not insect bite/sting, not medications, not new detergent/soap, not nuts, not plant contact, not pollen, not sick contacts and not sun exposure    Relieved by:  Nothing  Worsened by:  Nothing  Ineffective treatments:  None tried  Associated symptoms: no abdominal pain, no diarrhea, no fatigue, no fever, no headaches, no hoarse voice, no induration, no joint pain, no myalgias, no nausea, no periorbital edema, no shortness of breath, no sore throat, no throat swelling, no tongue swelling, no URI, not vomiting and not wheezing        Prior to Admission Medications   Prescriptions Last Dose Informant Patient Reported? Taking? cephalexin (KEFLEX) 500 mg capsule   No No   Sig: Take 1 capsule (500 mg total) by mouth every 6 (six) hours for 5 days   clotrimazole (LOTRIMIN) 1 % cream   No No   Sig: Apply to affected area 2 times daily   dicyclomine (BENTYL) 20 mg tablet   No No   Sig: Take 1 tablet (20 mg total) by mouth 2 (two) times a day   Patient not taking: Reported on 7/5/2022   metoclopramide (REGLAN) 10 mg tablet   No No   Sig: Take 1 tablet (10 mg total) by mouth every 6 (six) hours   Patient not taking: Reported on 7/5/2022      Facility-Administered Medications: None       Past Medical History:   Diagnosis Date    [facility]        History reviewed  No pertinent surgical history  History reviewed  No pertinent family history  I have reviewed and agree with the history as documented  E-Cigarette/Vaping     E-Cigarette/Vaping Substances     Social History     Tobacco Use    Smoking status: Current Every Day Smoker     Packs/day: 0 25     Types: Cigarettes    Smokeless tobacco: Never Used   Substance Use Topics    Alcohol use: Not Currently    Drug use: Never       Review of Systems   Constitutional: Negative for activity change, appetite change, chills, diaphoresis, fatigue, fever and unexpected weight change  HENT: Negative for congestion, dental problem, ear pain, hoarse voice, mouth sores, nosebleeds, sinus pressure, sinus pain, sneezing, sore throat and trouble swallowing  Respiratory: Negative for apnea, cough, choking, chest tightness, shortness of breath, wheezing and stridor  Cardiovascular: Negative for chest pain, palpitations and leg swelling  Gastrointestinal: Negative for abdominal pain, constipation, diarrhea, nausea and vomiting  Genitourinary: Negative for dysuria, flank pain, frequency and urgency  Musculoskeletal: Negative for arthralgias, joint swelling and myalgias  Skin: Positive for rash  Negative for color change and pallor     Neurological: Negative for dizziness, tremors, seizures, syncope, speech difficulty, weakness, light-headedness, numbness and headaches  All other systems reviewed and are negative  Physical Exam  Physical Exam  Vitals and nursing note reviewed  Constitutional:       General: She is not in acute distress  Appearance: Normal appearance  She is normal weight  She is not ill-appearing or toxic-appearing  HENT:      Head: Normocephalic and atraumatic  Mouth/Throat:      Mouth: Mucous membranes are moist       Pharynx: Oropharynx is clear  Comments: No soft or hard palate swelling  Uvula midline with no swelling  No tonsillar swelling  No tongue swelling  Airway intact  Tolerating secretions  Eyes:      Extraocular Movements: Extraocular movements intact  Pupils: Pupils are equal, round, and reactive to light  Cardiovascular:      Rate and Rhythm: Normal rate and regular rhythm  Pulses: Normal pulses  Heart sounds: Normal heart sounds  No murmur heard  No friction rub  No gallop  Pulmonary:      Effort: Pulmonary effort is normal  No respiratory distress  Breath sounds: Normal breath sounds  No stridor  No wheezing, rhonchi or rales  Abdominal:      General: Abdomen is flat  Bowel sounds are normal  There is no distension  Palpations: Abdomen is soft  There is no mass  Tenderness: There is no abdominal tenderness  There is no guarding or rebound  Hernia: No hernia is present  Musculoskeletal:         General: No swelling, tenderness, deformity or signs of injury  Normal range of motion  Cervical back: Normal range of motion  No rigidity or tenderness  Right lower leg: No edema  Left lower leg: No edema  Skin:     General: Skin is warm and dry  Capillary Refill: Capillary refill takes less than 2 seconds  Comments: Generalized maculopapular rash  Pruritic  Does not appear to be insect bites  Large plaque on left arm    Some scaling inside large plaque  Negative Nikolsky sign, no scaling or sloughing  Appears to be following skin tenting lines  Not on palms or soles  No mucosal lesions  No facial lesions  Neurological:      General: No focal deficit present  Mental Status: She is alert and oriented to person, place, and time  Mental status is at baseline  Cranial Nerves: No cranial nerve deficit  Sensory: No sensory deficit  Motor: No weakness  Coordination: Coordination normal                  Vital Signs  ED Triage Vitals   Temperature Pulse Respirations Blood Pressure SpO2   07/09/22 0140 07/09/22 0116 07/09/22 0116 07/09/22 0120 07/09/22 0116   98 5 °F (36 9 °C) 68 20 135/82 100 %      Temp Source Heart Rate Source Patient Position - Orthostatic VS BP Location FiO2 (%)   07/09/22 0140 07/09/22 0116 07/09/22 0120 07/09/22 0120 --   Oral Monitor Sitting Right arm       Pain Score       --                  Vitals:    07/09/22 0116 07/09/22 0120   BP:  135/82   Pulse: 68    Patient Position - Orthostatic VS:  Sitting         Visual Acuity      ED Medications  Medications   diphenhydrAMINE (BENADRYL) tablet 25 mg (25 mg Oral Given 7/9/22 0250)       Diagnostic Studies  Results Reviewed     None                 No orders to display              Procedures  Procedures         ED Course                               SBIRT 22yo+    Flowsheet Row Most Recent Value   SBIRT (25 yo +)    In order to provide better care to our patients, we are screening all of our patients for alcohol and drug use  Would it be okay to ask you these screening questions? No Filed at: 07/09/2022 0157                    MDM  Number of Diagnoses or Management Options  Rash and nonspecific skin eruption: new and does not require workup  Diagnosis management comments: Patient was seen and examined  in the emergency department for chief complaint of generalized rash   The patient presented 10 days from original presentation of rash on left arm and 3 days after presentation of generalized rash  No fever, toxic appearance, hypertension, mucosal lesions, palms and soles lesions, not severe pain only pruritic, no immunosuppression  Not sloughing  Negative Nikolsky sign  Patient denies any concern for STDs including syphilis  No new detergents, chemicals, or other exposures  On exam patient is in no acute distress, lesions appear pruritic  There is a large plaque to left arm some scaling in it  Otherwise small areas of papules without scaling or sloughing  Appears to follow skin tension lines  Does not appear to be secondary syphilis  No other red flag symptoms  , not on palms and soles  No evidence of vesicles  Not in groups of 3 to suggest bedbugs  DDX including but not limited to: allergic reaction, urticaria, cellulitis, contact dermatitis, allergic dermatitis, poison ivy/oak/sumac, vasculitis, herpes zoster, infectious etiology, bullous pemphigus, scabies; doubt staph scalded skin syndrome or Mane Lianet syndrome  Workup:  At this point will continue symptomatic management with Dermatology follow-up  Continue antifungal   At this point I suspect this may possibly viral in origin possibly Pityriasis rosea secondary to following skin tension lines  Does not appear to be any of the erythemas    As patient is nontoxic feel like is appropriate follow-up outpatient Dermatology  Referral placed  Benadryl as needed for pruritus  Disposition:  General impression 41-year-old female nonspecific rash and skin eruption  Follow-up Dermatology outpatient as soon as possible  PCP follow-up in 2 days for skin check  Benadryl/ Zyrtec for symptomatic relief  The patient was discharged in stable condition  Patient ambulated off the department  Extensive return to emergency department precautions were discussed  Follow up with appropriate providers including primary care physician was discussed    Patient and/or their  primary decision maker expressed understanding  Patient remained stable during entire emergency department stay  Amount and/or Complexity of Data Reviewed  Review and summarize past medical records: yes  Independent visualization of images, tracings, or specimens: yes    Risk of Complications, Morbidity, and/or Mortality  Presenting problems: low  Diagnostic procedures: low  Management options: low    Patient Progress  Patient progress: stable      Disposition  Final diagnoses:   Rash and nonspecific skin eruption     Time reflects when diagnosis was documented in both MDM as applicable and the Disposition within this note     Time User Action Codes Description Comment    7/9/2022  2:47 AM Sophie Santa Add [R21] Rash and nonspecific skin eruption       ED Disposition     ED Disposition   Discharge    Condition   Stable    Date/Time   Sat Jul 9, 2022  2:47 AM    Comment   [address] [facility] discharge to home/self care                 Follow-up Information     Follow up With Specialties Details Why [address] [facility] Emergency Department Emergency Medicine Go to  As needed, If symptoms worsen [facility] [id number]  [address] [facility] Emergency Department, [address] [place] [address] 200  Call  To schedule an appointment with a primary care [phone]       [facility] Go to  for follow-up of symtptoms as needed after pediatrician follow-up [address] [facility]  [phone] [facility], [address], [place], [facility]          Discharge Medication List as of 7/9/2022  2:52 AM      START taking these medications    Details   diphenhydrAMINE (BENADRYL) 25 mg tablet Take 1 tablet (25 mg total) by mouth every 6 (six) hours, Starting Sat 7/9/2022, Normal CONTINUE these medications which have NOT CHANGED    Details   cephalexin (KEFLEX) 500 mg capsule Take 1 capsule (500 mg total) by mouth every 6 (six) hours for 5 days, Starting Tue 7/5/2022, Until Sun 7/10/2022, Print      clotrimazole (LOTRIMIN) 1 % cream Apply to affected area 2 times daily, Print      dicyclomine (BENTYL) 20 mg tablet Take 1 tablet (20 mg total) by mouth 2 (two) times a day, Starting Tue 6/7/2022, Print      metoclopramide (REGLAN) 10 mg tablet Take 1 tablet (10 mg total) by mouth every 6 (six) hours, Starting Tue 6/7/2022, Print                 PDMP Review     None          ED Provider  Electronically Signed by           Margie Cockayne, PA-C  07/09/22 8870